# Patient Record
Sex: FEMALE | Race: WHITE | Employment: FULL TIME | URBAN - METROPOLITAN AREA
[De-identification: names, ages, dates, MRNs, and addresses within clinical notes are randomized per-mention and may not be internally consistent; named-entity substitution may affect disease eponyms.]

---

## 2018-11-06 ENCOUNTER — OFFICE VISIT (OUTPATIENT)
Dept: DERMATOLOGY | Age: 63
End: 2018-11-06
Payer: COMMERCIAL

## 2018-11-06 DIAGNOSIS — L57.0 KERATOSIS, ACTINIC: Primary | ICD-10-CM

## 2018-11-06 DIAGNOSIS — D22.9 BENIGN NEVUS: ICD-10-CM

## 2018-11-06 DIAGNOSIS — L81.4 LENTIGO: ICD-10-CM

## 2018-11-06 DIAGNOSIS — L82.1 SEBORRHEIC KERATOSES: ICD-10-CM

## 2018-11-06 PROCEDURE — 99203 OFFICE O/P NEW LOW 30 MIN: CPT | Performed by: DERMATOLOGY

## 2018-11-06 PROCEDURE — 17003 DESTRUCT PREMALG LES 2-14: CPT | Performed by: DERMATOLOGY

## 2018-11-06 PROCEDURE — 17000 DESTRUCT PREMALG LESION: CPT | Performed by: DERMATOLOGY

## 2018-11-06 NOTE — PROGRESS NOTES
erythematous papule above left eyebrow and on left cheek ×2  Symmetric evenly pigmented tan lentigo left cheek  Scattered hyperkeratotic stuck on papules and plaques over her torso      Assessment and Plan     1. Keratosis, actinic - Left forehead, left cheek-3 lesion(s) treated w/ liquid nitrogen. Edu re: risk of blister formation, discomfort, scar, hypopigmentation. Discussed wound care. 2. Lentigo - Left cheek-1 lesion(s) treated w/ liquid nitrogen. Edu re: risk of blister formation, discomfort, scar, hypopigmentation. Discussed wound care. No charge    3. Benign nevus - Benign acquired melanocytic nevi  -Recommend monthly self skin exams   -Educated regarding the ABCDEs of melanoma detection   -Call for any new/changing moles or concerning lesions  -Reviewed sun protective behavior -- sun avoidance during the peak hours of the day, sun-protective clothing (including hat and sunglasses), sunscreen use (water resistant, broad spectrum, SPF at least 30, need for reapplication every 2 to 3 hours), avoidance of tanning beds      4.  Seborrheic keratoses -Monitor for change

## 2019-09-03 ENCOUNTER — OFFICE VISIT (OUTPATIENT)
Dept: DERMATOLOGY | Age: 64
End: 2019-09-03
Payer: COMMERCIAL

## 2019-09-03 DIAGNOSIS — L82.1 SEBORRHEIC KERATOSES: ICD-10-CM

## 2019-09-03 DIAGNOSIS — L82.0 SEBORRHEIC KERATOSES, INFLAMED: ICD-10-CM

## 2019-09-03 DIAGNOSIS — D22.9 BENIGN NEVUS: ICD-10-CM

## 2019-09-03 DIAGNOSIS — L57.0 KERATOSIS, ACTINIC: Primary | ICD-10-CM

## 2019-09-03 PROCEDURE — 17000 DESTRUCT PREMALG LESION: CPT | Performed by: DERMATOLOGY

## 2019-09-03 PROCEDURE — 17003 DESTRUCT PREMALG LES 2-14: CPT | Performed by: DERMATOLOGY

## 2019-09-03 PROCEDURE — 99214 OFFICE O/P EST MOD 30 MIN: CPT | Performed by: DERMATOLOGY

## 2019-09-03 PROCEDURE — 17110 DESTRUCTION B9 LES UP TO 14: CPT | Performed by: DERMATOLOGY

## 2019-10-11 ENCOUNTER — TELEPHONE (OUTPATIENT)
Dept: DERMATOLOGY | Age: 64
End: 2019-10-11

## 2019-10-29 ENCOUNTER — OFFICE VISIT (OUTPATIENT)
Dept: DERMATOLOGY | Age: 64
End: 2019-10-29
Payer: COMMERCIAL

## 2019-10-29 DIAGNOSIS — L57.0 KERATOSIS, ACTINIC: ICD-10-CM

## 2019-10-29 DIAGNOSIS — D22.9 BENIGN NEVUS: Primary | ICD-10-CM

## 2019-10-29 DIAGNOSIS — B00.9 HSV (HERPES SIMPLEX VIRUS) INFECTION: ICD-10-CM

## 2019-10-29 PROCEDURE — 17003 DESTRUCT PREMALG LES 2-14: CPT | Performed by: DERMATOLOGY

## 2019-10-29 PROCEDURE — 99213 OFFICE O/P EST LOW 20 MIN: CPT | Performed by: DERMATOLOGY

## 2019-10-29 PROCEDURE — 17000 DESTRUCT PREMALG LESION: CPT | Performed by: DERMATOLOGY

## 2019-10-29 RX ORDER — VALACYCLOVIR HYDROCHLORIDE 1 G/1
TABLET, FILM COATED ORAL
Qty: 8 TABLET | Refills: 2 | Status: SHIPPED | OUTPATIENT
Start: 2019-10-29 | End: 2021-01-05 | Stop reason: SDUPTHER

## 2021-01-05 ENCOUNTER — OFFICE VISIT (OUTPATIENT)
Dept: DERMATOLOGY | Age: 66
End: 2021-01-05
Payer: MEDICARE

## 2021-01-05 VITALS — TEMPERATURE: 97.3 F

## 2021-01-05 DIAGNOSIS — L71.0 PERIORAL DERMATITIS: ICD-10-CM

## 2021-01-05 DIAGNOSIS — B00.9 HSV INFECTION: Primary | ICD-10-CM

## 2021-01-05 DIAGNOSIS — L72.11 PILAR CYST: ICD-10-CM

## 2021-01-05 DIAGNOSIS — D22.9 BENIGN NEVUS: ICD-10-CM

## 2021-01-05 PROCEDURE — 1090F PRES/ABSN URINE INCON ASSESS: CPT | Performed by: DERMATOLOGY

## 2021-01-05 PROCEDURE — G8400 PT W/DXA NO RESULTS DOC: HCPCS | Performed by: DERMATOLOGY

## 2021-01-05 PROCEDURE — 99214 OFFICE O/P EST MOD 30 MIN: CPT | Performed by: DERMATOLOGY

## 2021-01-05 PROCEDURE — 3017F COLORECTAL CA SCREEN DOC REV: CPT | Performed by: DERMATOLOGY

## 2021-01-05 PROCEDURE — G8427 DOCREV CUR MEDS BY ELIG CLIN: HCPCS | Performed by: DERMATOLOGY

## 2021-01-05 PROCEDURE — 1123F ACP DISCUSS/DSCN MKR DOCD: CPT | Performed by: DERMATOLOGY

## 2021-01-05 PROCEDURE — 1036F TOBACCO NON-USER: CPT | Performed by: DERMATOLOGY

## 2021-01-05 PROCEDURE — G8421 BMI NOT CALCULATED: HCPCS | Performed by: DERMATOLOGY

## 2021-01-05 PROCEDURE — G8484 FLU IMMUNIZE NO ADMIN: HCPCS | Performed by: DERMATOLOGY

## 2021-01-05 PROCEDURE — 4040F PNEUMOC VAC/ADMIN/RCVD: CPT | Performed by: DERMATOLOGY

## 2021-01-05 RX ORDER — VALACYCLOVIR HYDROCHLORIDE 1 G/1
TABLET, FILM COATED ORAL
Qty: 8 TABLET | Refills: 2 | Status: SHIPPED | OUTPATIENT
Start: 2021-01-05 | End: 2022-03-26 | Stop reason: SDUPTHER

## 2021-01-05 NOTE — PROGRESS NOTES
[facility]) Dermatology  Germaine Youssef M.D.  [phone]       [place] Halo  1955    77 y.o. female     Date of Visit: 1/5/2021    Chief Complaint:   Chief Complaint   Patient presents with    Skin Lesion     tbse        I was asked to see this patient by Dr. Gallego ref. provider found. History of Present Illness:  1. TBSE    Recurrent labial herpes simplex-usually in the spring and fall. Previously given Valtrex which she was interested in trying but was only dispensed 2 pills by her pharmacy. Would like to proceed with this prescription if she is able to get this covered by insurance. Does develop multiple outbreaks per year. Usually seasonally. Pilar cyst right occipital scalp. Present for years. May have slowly increased in size. Asymptomatic. Not draining. New rash right nasolabial fold. New in the last couple of months. No topical or inhaled steroids. Not previously treated. Multiple nevi. Stable in size, shape, color. Has not noticed any new or changing pigmented lesions. Does monitor her skin for change    Retired special ed     Skin History:     Former patient in [facility]     No personal history of skin cancer.       Family history positive for a brother with basal cell carcinoma     Multiple blistering sunburns when she was younger    Recurrent labial herpes simplex-Valtrex    Review of Systems:  Constitutional: Reports general sense of well-being       Past Medical History, Surgical History, Family History, Medications and Allergies reviewed.     Social History:   Social History     Socioeconomic History    Marital status:      Spouse name: Not on file    Number of children: Not on file    Years of education: Not on file    Highest education level: Not on file   Occupational History    Not on file   Social Needs    Financial resource strain: Not on file    Food insecurity     Worry: Not on file     Inability: Not on file   Aveillant needs or symptomatic cysts   3. Benign nevus - Benign acquired melanocytic nevi  -Recommend monthly self skin exams   -Educated regarding the ABCDEs of melanoma detection   -Call for any new/changing moles or concerning lesions  -Reviewed sun protective behavior -- sun avoidance during the peak hours of the day, sun-protective clothing (including hat and sunglasses), sunscreen use (water resistant, broad spectrum, SPF at least 30, need for reapplication every 2 to 3 hours), avoidance of tanning beds      4. Perioral dermatitis-MetroCream 0.75% twice daily. Discussed gentle soaps. Discussed high risk of recurrence-use MetroCream until completely resolved and then for an additional week or 2. Refill given.

## 2021-11-17 ENCOUNTER — OFFICE VISIT (OUTPATIENT)
Dept: DERMATOLOGY | Age: 66
End: 2021-11-17
Payer: MEDICARE

## 2021-11-17 DIAGNOSIS — B00.9 HSV INFECTION: ICD-10-CM

## 2021-11-17 DIAGNOSIS — L71.9 ROSACEA: ICD-10-CM

## 2021-11-17 DIAGNOSIS — D22.5 IRRITATED NEVUS OF BACK: ICD-10-CM

## 2021-11-17 DIAGNOSIS — L72.11 PILAR CYST: Primary | ICD-10-CM

## 2021-11-17 PROCEDURE — G8427 DOCREV CUR MEDS BY ELIG CLIN: HCPCS | Performed by: DERMATOLOGY

## 2021-11-17 PROCEDURE — G8400 PT W/DXA NO RESULTS DOC: HCPCS | Performed by: DERMATOLOGY

## 2021-11-17 PROCEDURE — 4040F PNEUMOC VAC/ADMIN/RCVD: CPT | Performed by: DERMATOLOGY

## 2021-11-17 PROCEDURE — 1036F TOBACCO NON-USER: CPT | Performed by: DERMATOLOGY

## 2021-11-17 PROCEDURE — 3017F COLORECTAL CA SCREEN DOC REV: CPT | Performed by: DERMATOLOGY

## 2021-11-17 PROCEDURE — 11300 SHAVE SKIN LESION 0.5 CM/<: CPT | Performed by: DERMATOLOGY

## 2021-11-17 PROCEDURE — G8484 FLU IMMUNIZE NO ADMIN: HCPCS | Performed by: DERMATOLOGY

## 2021-11-17 PROCEDURE — G8421 BMI NOT CALCULATED: HCPCS | Performed by: DERMATOLOGY

## 2021-11-17 PROCEDURE — 99214 OFFICE O/P EST MOD 30 MIN: CPT | Performed by: DERMATOLOGY

## 2021-11-17 PROCEDURE — 1123F ACP DISCUSS/DSCN MKR DOCD: CPT | Performed by: DERMATOLOGY

## 2021-11-17 PROCEDURE — 1090F PRES/ABSN URINE INCON ASSESS: CPT | Performed by: DERMATOLOGY

## 2021-11-17 RX ORDER — METRONIDAZOLE 7.5 MG/G
GEL TOPICAL
Qty: 45 G | Refills: 6 | Status: SHIPPED | OUTPATIENT
Start: 2021-11-17 | End: 2022-05-11 | Stop reason: SDUPTHER

## 2021-11-17 NOTE — PROGRESS NOTES
[facility]) Dermatology  Uche Mclaughlin M.D.  [phone]       Filiberto Santos  1955    74 y.o. female     Date of Visit: 11/17/2021    Chief Complaint:   Chief Complaint   Patient presents with    Skin Lesion     scalp, back        I was asked to see this patient by Dr. Gallego ref. provider found. History of Present Illness:  1. Presents today for evaluation of 2 issues    Pilar cyst right occipital scalp-patient feels that it stable. Not increasing in size. Asymptomatic. Raised papule right upper paraspinal back-May have increased in size, starting to become bothersome, catching on clothing. Has had no active labial herpes simplex since her last visit-does have Valtrex at home    Rosacea-uses MetroCream 0.75%-it is helpful. Uses episodically for flares. Reluctant to use this consistently as it is expensive. Has found a better price on good Rx and would like a prescription for MetroGel. Retired special ed      Skin History:     Former patient in [facility]     No personal history of skin cancer.       Family history positive for a brother with basal cell carcinoma     Multiple blistering sunburns when she was younger     Recurrent labial herpes simplex-Valtrex    Review of Systems:  Constitutional: Reports general sense of well-being       Past Medical History, Surgical History, Family History, Medications and Allergies reviewed.     Social History:   Social History     Socioeconomic History    Marital status:      Spouse name: Not on file    Number of children: Not on file    Years of education: Not on file    Highest education level: Not on file   Occupational History    Not on file   Tobacco Use    Smoking status: Never Smoker    Smokeless tobacco: Never Used   Vaping Use    Vaping Use: Never used   Substance and Sexual Activity    Alcohol use: No    Drug use: No    Sexual activity: Not on file   Other Topics Concern    Not on file   Social History Narrative    Not on file     Social Determinants of Health     Financial Resource Strain:     Difficulty of Paying Living Expenses: Not on file   Food Insecurity:     Worried About Running Out of Food in the Last Year: Not on file    Kurt of Food in the Last Year: Not on file   Transportation Needs:     Lack of Transportation (Medical): Not on file    Lack of Transportation (Non-Medical): Not on file   Physical Activity:     Days of Exercise per Week: Not on file    Minutes of Exercise per Session: Not on file   Stress:     Feeling of Stress : Not on file   Social Connections:     Frequency of Communication with Friends and Family: Not on file    Frequency of Social Gatherings with Friends and Family: Not on file    Attends Buddhist Services: Not on file    Active Member of [address] [place] [address] [employer] or Organizations: Not on file    Attends Club or Organization Meetings: Not on file    Marital Status: Not on file   Intimate Partner Violence:     Fear of Current or Ex-Partner: Not on file    Emotionally Abused: Not on file    Physically Abused: Not on file    Sexually Abused: Not on file   Housing Stability:     Unable to Pay for Housing in the Last Year: Not on file    Number of [place] in the Last Year: Not on file    Unstable Housing in the Last Year: Not on file       Physical Examination       -General: Well-appearing, NAD  1. Limited exam  6 mm mobile soft nodule right occipital scalp  4 mm raised flesh-colored papule right upper paraspinal back  No active herpes simplex  Background erythema 1+ inflammatory papules nose and paranasal cheeks      Assessment and Plan     1. Pilar cyst-monitor for change. If lesion increases in size, discussed removal   2. Irritated nevus of back -right upper paraspinal back--Verbal consent obtained after risks (infection, bleeding, scar), benefits and alternatives explained. -Area(s) to be shaved were marked with a surgical pen. Site(s) were cleansed with alcohol.  Local anesthesia achieved with 1% lidocaine with epinephrine/sodium bicarbonate. Shave lesion performed with a razor blade. Hemostasis was achieved with aluminum chloride. The wound(s) were dressed with petrolatum and covered with a bandage. Specimen(s) sent to pathology. Pt educated re: risk of bleeding, infection, scar and wound care instructions. 3. HSV infection -Valtrex 2 g p.o. once, repeat in 12 hours as needed flares. Reviewed proper use and side effects   4. Rosacea-chronic problem, not at treatment goal-change to MetroGel 0.75%-discussed continuous versus episodic treatment-May have better improvement with continuous use.

## 2021-11-19 LAB — DERMATOLOGY PATHOLOGY REPORT: NORMAL

## 2022-03-26 RX ORDER — VALACYCLOVIR HYDROCHLORIDE 1 G/1
TABLET, FILM COATED ORAL
Qty: 8 TABLET | Refills: 2 | OUTPATIENT
Start: 2022-03-26

## 2022-03-26 RX ORDER — VALACYCLOVIR HYDROCHLORIDE 1 G/1
TABLET, FILM COATED ORAL
Qty: 8 TABLET | Refills: 2 | Status: SHIPPED | OUTPATIENT
Start: 2022-03-26 | End: 2022-05-11 | Stop reason: SDUPTHER

## 2022-03-28 RX ORDER — VALACYCLOVIR HYDROCHLORIDE 1 G/1
TABLET, FILM COATED ORAL
Qty: 8 TABLET | Refills: 2 | OUTPATIENT
Start: 2022-03-28

## 2022-05-11 ENCOUNTER — OFFICE VISIT (OUTPATIENT)
Dept: DERMATOLOGY | Age: 67
End: 2022-05-11
Payer: MEDICARE

## 2022-05-11 DIAGNOSIS — L71.9 ROSACEA: ICD-10-CM

## 2022-05-11 DIAGNOSIS — L72.11 PILAR CYST: ICD-10-CM

## 2022-05-11 DIAGNOSIS — D22.9 BENIGN NEVUS: ICD-10-CM

## 2022-05-11 DIAGNOSIS — L65.8 FEMALE PATTERN ALOPECIA: ICD-10-CM

## 2022-05-11 DIAGNOSIS — B00.9 HSV INFECTION: Primary | ICD-10-CM

## 2022-05-11 PROCEDURE — G8427 DOCREV CUR MEDS BY ELIG CLIN: HCPCS | Performed by: DERMATOLOGY

## 2022-05-11 PROCEDURE — 99214 OFFICE O/P EST MOD 30 MIN: CPT | Performed by: DERMATOLOGY

## 2022-05-11 PROCEDURE — 1123F ACP DISCUSS/DSCN MKR DOCD: CPT | Performed by: DERMATOLOGY

## 2022-05-11 PROCEDURE — G8421 BMI NOT CALCULATED: HCPCS | Performed by: DERMATOLOGY

## 2022-05-11 PROCEDURE — 1090F PRES/ABSN URINE INCON ASSESS: CPT | Performed by: DERMATOLOGY

## 2022-05-11 PROCEDURE — G8400 PT W/DXA NO RESULTS DOC: HCPCS | Performed by: DERMATOLOGY

## 2022-05-11 PROCEDURE — 4040F PNEUMOC VAC/ADMIN/RCVD: CPT | Performed by: DERMATOLOGY

## 2022-05-11 PROCEDURE — 1036F TOBACCO NON-USER: CPT | Performed by: DERMATOLOGY

## 2022-05-11 PROCEDURE — 3017F COLORECTAL CA SCREEN DOC REV: CPT | Performed by: DERMATOLOGY

## 2022-05-11 RX ORDER — METRONIDAZOLE 7.5 MG/G
GEL TOPICAL
Qty: 45 G | Refills: 6 | Status: SHIPPED | OUTPATIENT
Start: 2022-05-11

## 2022-05-11 RX ORDER — VALACYCLOVIR HYDROCHLORIDE 1 G/1
TABLET, FILM COATED ORAL
Qty: 8 TABLET | Refills: 6 | Status: SHIPPED | OUTPATIENT
Start: 2022-05-11

## 2022-05-11 NOTE — PROGRESS NOTES
[facility]) Dermatology  Jina Topete M.D.  [phone]       Marcel Madrid  1955    74 y.o. female     Date of Visit: 5/11/2022    Chief Complaint:   Chief Complaint   Patient presents with    Skin Lesion        I was asked to see this patient by Dr. Gallego ref. provider found. History of Present Illness:  1. Total-body skin exam    Rosacea-uses MetroGel 0.75% twice daily as needed flares-treats episodically with good improvement    Labial herpes simplex-occasional outbreaks-uses Valtrex 2 g p.o. once, repeat in 12 hours as needed with good improvement. Tolerates without difficulty and does have improvement of labial herpes simplex    Female pattern alopecia-progressive. Daughter getting  in November. Used minoxidil topically for 2 months with minimal improvement. Tolerated without difficulty    Pilar cyst right scalp-does not seem to be increasing in size. Mildly bothersome to patient. No history of heart valve abnormality, joint replacement, pacemaker, not anticoagulated      Retired special ed      Skin History:     Former patient in [facility]     No personal history of skin cancer.       Family history positive for a brother with basal cell carcinoma     Multiple blistering sunburns when she was younger     Recurrent labial herpes simplex-Valtrex    Review of Systems:  Constitutional: Reports general sense of well-being       Past Medical History, Surgical History, Family History, Medications and Allergies reviewed.     Social History:   Social History     Socioeconomic History    Marital status:      Spouse name: Not on file    Number of children: Not on file    Years of education: Not on file    Highest education level: Not on file   Occupational History    Not on file   Tobacco Use    Smoking status: Never Smoker    Smokeless tobacco: Never Used   Vaping Use    Vaping Use: Never used   Substance and Sexual Activity    Alcohol use: No    Drug use: No    Sexual activity: Not on file   Other Topics Concern    Not on file   Social History Narrative    Not on file     Social Determinants of Health     Financial Resource Strain:     Difficulty of Paying Living Expenses: Not on file   Food Insecurity:     Worried About Running Out of Food in the Last Year: Not on file    Kurt of Food in the Last Year: Not on file   Transportation Needs:     Lack of Transportation (Medical): Not on file    Lack of Transportation (Non-Medical): Not on file   Physical Activity:     Days of Exercise per Week: Not on file    Minutes of Exercise per Session: Not on file   Stress:     Feeling of Stress : Not on file   Social Connections:     Frequency of Communication with Friends and Family: Not on file    Frequency of Social Gatherings with Friends and Family: Not on file    Attends Jew Services: Not on file    Active Member of [address] [place] [address] [employer] or Organizations: Not on file    Attends Club or Organization Meetings: Not on file    Marital Status: Not on file   Intimate Partner Violence:     Fear of Current or Ex-Partner: Not on file    Emotionally Abused: Not on file    Physically Abused: Not on file    Sexually Abused: Not on file   Housing Stability:     Unable to Pay for Housing in the Last Year: Not on file    Number of [place] in the Last Year: Not on file    Unstable Housing in the Last Year: Not on file       Physical Examination       -General: Well-appearing, NAD  1. Normal affect. Total body skin exam including scalp, face, neck, chest, abdomen, back, bilateral upper extremities, bilateral lower extremities, ocular conjunctiva, external lips, and nails was performed. Examination normal unless stated below. Underwear area not examined. Scattered on the trunk and extremities are multiple well-defined round and oval symmetric smoothly-bordered uniformly brown macules and papules. Background erythema with telangiectasias nose and cheeks.   No active labial herpes simplex. Mild female pattern alopecia with thinning vertex and anterior scalp-widening of part. Mobile 8 mm nodule right scalp-pilar cyst      Assessment and Plan     1. HSV infection-Valtrex 2 g p.o. once, repeat once in 12 hours. Reviewed proper use and side of   2. Rosacea-MetroGel 0.75% twice daily-discussed episodic versus ongoing treatment   3. Pilar cyst-discussed excision with patient who declines for now. If lesion starts to increase in size or become bothersome, consider excision   4. Benign nevus - Benign acquired melanocytic nevi  -Recommend monthly self skin exams   -Educated regarding the ABCDEs of melanoma detection   -Call for any new/changing moles or concerning lesions  -Reviewed sun protective behavior -- sun avoidance during the peak hours of the day, sun-protective clothing (including hat and sunglasses), sunscreen use (water resistant, broad spectrum, SPF at least 30, need for reapplication every 2 to 3 hours), avoidance of tanning beds      5. Female pattern alopecia-minoxidil 5% solution nightly-discussed treating for 3 to 4 months. Discussed spironolactone which he declines for now.

## 2023-06-21 ENCOUNTER — OFFICE VISIT (OUTPATIENT)
Dept: DERMATOLOGY | Age: 68
End: 2023-06-21

## 2023-06-21 DIAGNOSIS — L71.9 ROSACEA: ICD-10-CM

## 2023-06-21 DIAGNOSIS — L57.0 AK (ACTINIC KERATOSIS): Primary | ICD-10-CM

## 2023-06-21 DIAGNOSIS — D48.5 NEOPLASM OF UNCERTAIN BEHAVIOR OF SKIN: ICD-10-CM

## 2023-06-21 DIAGNOSIS — B00.9 HSV INFECTION: ICD-10-CM

## 2023-06-21 RX ORDER — METRONIDAZOLE 7.5 MG/G
GEL TOPICAL
Qty: 45 G | Refills: 6 | Status: SHIPPED | OUTPATIENT
Start: 2023-06-21

## 2023-06-21 RX ORDER — MULTIVIT WITH MINERALS/LUTEIN
250 TABLET ORAL DAILY
COMMUNITY

## 2023-06-21 RX ORDER — VALACYCLOVIR HYDROCHLORIDE 1 G/1
TABLET, FILM COATED ORAL
Qty: 8 TABLET | Refills: 6 | Status: SHIPPED | OUTPATIENT
Start: 2023-06-21

## 2023-06-21 NOTE — PROGRESS NOTES
[facility]) Dermatology  Daniel Sánchez M.D.  [phone]       Chalino Llamas  1955    69 y.o. female     Date of Visit: 6/21/2023    Chief Complaint:   Chief Complaint   Patient presents with    Skin Lesion        I was asked to see this patient by Dr. Gallego ref. provider found. History of Present Illness:  1. Total-body skin exam    New dry papules left cheek, right cheek. Dry but not itching, bleeding. Asymptomatic    Recurrent labial herpes simplex-approximately 2 outbreaks per year. Takes Valtrex with excellent improvement. Needs a refill    Rosacea-improved with Metro gel and cream-has used both previously. Sometimes prefers 1 to the other and MetroGel has been less expensive in the past.  Does have fewer inflammatory papules and needs a refill. Skin History: Former patient in [facility]     No personal history of skin cancer. Family history positive for a brother with basal cell carcinoma     Multiple blistering sunburns when she was younger     Recurrent labial herpes simplex-Valtrex    Review of Systems:  Constitutional: Reports general sense of well-being       Past Medical History, Surgical History, Family History, Medications and Allergies reviewed.     Social History:   Social History     Socioeconomic History    Marital status:      Spouse name: Not on file    Number of children: Not on file    Years of education: Not on file    Highest education level: Not on file   Occupational History    Not on file   Tobacco Use    Smoking status: Never    Smokeless tobacco: Never   Vaping Use    Vaping Use: Never used   Substance and Sexual Activity    Alcohol use: No    Drug use: No    Sexual activity: Not on file   Other Topics Concern    Not on file   Social History Narrative    Not on file     Social Determinants of Health     Financial Resource Strain: Not on file   Food Insecurity: Not on file   Transportation Needs: Not on file   Physical Activity: Not on file   Stress: Not on

## 2023-06-26 LAB — DERMATOLOGY PATHOLOGY REPORT: NORMAL

## 2024-05-29 ENCOUNTER — OFFICE VISIT (OUTPATIENT)
Dept: DERMATOLOGY | Age: 69
End: 2024-05-29

## 2024-05-29 DIAGNOSIS — L82.0 SEBORRHEIC KERATOSES, INFLAMED: ICD-10-CM

## 2024-05-29 DIAGNOSIS — L71.9 ROSACEA: ICD-10-CM

## 2024-05-29 DIAGNOSIS — D48.5 NEOPLASM OF UNCERTAIN BEHAVIOR OF SKIN: Primary | ICD-10-CM

## 2024-05-29 DIAGNOSIS — B00.9 HSV INFECTION: ICD-10-CM

## 2024-05-29 RX ORDER — METRONIDAZOLE 7.5 MG/G
GEL TOPICAL
Qty: 45 G | Refills: 6 | Status: SHIPPED | OUTPATIENT
Start: 2024-05-29

## 2024-05-29 RX ORDER — VALACYCLOVIR HYDROCHLORIDE 1 G/1
TABLET, FILM COATED ORAL
Qty: 8 TABLET | Refills: 6 | Status: SHIPPED | OUTPATIENT
Start: 2024-05-29

## 2024-05-29 NOTE — PROGRESS NOTES
Strain: Not on file   Food Insecurity: Not on file   Transportation Needs: Not on file   Physical Activity: Not on file   Stress: Not on file   Social Connections: Not on file   Intimate Partner Violence: Not on file   Housing Stability: Not on file       Physical Examination       -General: Well-appearing, NAD  1. Normal affect.  Total body skin exam including scalp, face, neck, chest, abdomen, back, bilateral upper extremities, bilateral lower extremities, ocular conjunctiva, external lips, and nails was performed.  Examination normal unless stated below.  Underwear area not examined.  Scattered on the trunk and extremities are multiple well-defined round and oval symmetric smoothly-bordered uniformly brown macules and papules.  No active labial herpes simplex today.  Background erythema nose with mild scale.  No inflammatory papules.  Hyperkeratotic stuck on visibly excoriated papule right and left dorsal hand  Right distal anterior lower leg 6 mm thin erythematous papule rule out basal cell carcinoma      Assessment and Plan     1. Neoplasm of uncertain behavior of skin-right distal anterior lower leg--Discussed possible diagnosis. Patient agreeable to biopsy. Verbal consent obtained after risks (infection, bleeding, scar), benefits and alternatives explained.   -Area(s) to be biopsied were marked with a surgical pen. Site(s) were cleansed with alcohol. Local anesthesia achieved with 1% lidocaine with epinephrine/sodium bicarbonate. Shave biopsy performed with a razor blade. Hemostasis was achieved with aluminum chloride. The wound(s) were dressed with petrolatum and covered with a bandage. Specimen(s) sent to pathology. Pt educated re: risk of bleeding, infection, scar and wound care instructions.     2. HSV infection-Valtrex 2 g p.o. once, repeat in 12 hours as needed outbreak   3. Rosacea-MetroCream or gel per patient preference daily to twice daily.   4.  Inflamed seborrheic keratosis left and right dorsal

## 2024-06-03 LAB — DERMATOLOGY PATHOLOGY REPORT: NORMAL
